# Patient Record
Sex: MALE | Race: WHITE | Employment: FULL TIME | URBAN - METROPOLITAN AREA
[De-identification: names, ages, dates, MRNs, and addresses within clinical notes are randomized per-mention and may not be internally consistent; named-entity substitution may affect disease eponyms.]

---

## 2021-07-21 ENCOUNTER — OFFICE VISIT (OUTPATIENT)
Dept: FAMILY MEDICINE CLINIC | Age: 36
End: 2021-07-21
Payer: COMMERCIAL

## 2021-07-21 ENCOUNTER — TELEPHONE (OUTPATIENT)
Dept: FAMILY MEDICINE CLINIC | Age: 36
End: 2021-07-21

## 2021-07-21 VITALS
OXYGEN SATURATION: 95 % | SYSTOLIC BLOOD PRESSURE: 110 MMHG | HEART RATE: 88 BPM | DIASTOLIC BLOOD PRESSURE: 60 MMHG | WEIGHT: 249.4 LBS | TEMPERATURE: 97.2 F

## 2021-07-21 DIAGNOSIS — R10.9 LEFT FLANK PAIN: Primary | ICD-10-CM

## 2021-07-21 LAB
BILIRUBIN, POC: NEGATIVE
BLOOD URINE, POC: NEGATIVE
CLARITY, POC: CLEAR
COLOR, POC: YELLOW
GLUCOSE URINE, POC: NEGATIVE
KETONES, POC: NEGATIVE
LEUKOCYTE EST, POC: NEGATIVE
NITRITE, POC: NEGATIVE
PH, POC: 7
PROTEIN, POC: NEGATIVE
SPECIFIC GRAVITY, POC: 1.02
UROBILINOGEN, POC: NORMAL

## 2021-07-21 PROCEDURE — 99202 OFFICE O/P NEW SF 15 MIN: CPT | Performed by: PHYSICIAN ASSISTANT

## 2021-07-21 PROCEDURE — 81002 URINALYSIS NONAUTO W/O SCOPE: CPT | Performed by: PHYSICIAN ASSISTANT

## 2021-07-21 NOTE — PROGRESS NOTES
7/21/2021    Davey Tellez    Chief Complaint   Patient presents with    Pain     left sided, near back, x 2 weeks ago     HPI  History was obtained from patient. Yordy De Anda is a 39 y.o. male who presents today with left side pain (points to left flank). The pain has been present for 2 weeks and is \"achy\" in nature and constant. Unable to rate the pain. States it is more annoying than painful. Waxes and wanes. There has been no injury. Denies abdominal pain, urinary frequency, urgency, dysuria, hematuria, penile discharge or bleeding, testicular pain or swelling, nausea, vomiting, fever, chills. There is no radiating pain. No buttock, leg pain, saddle anesthesia, numbness, tingling, bowel or bladder incontinence. Denies recent or current skin rash of this area. Denies recent or current cold-like symptoms, cough, chest pain, or shortness of breath. Denies constipation, melena, or hematochezia. No aggravating or alleviating factors identified. No hx of kidney stones. REVIEW OF SYMPTOMS    Constitutional:  Denies fever, chills, weight loss or weakness  Eyes:  Denies vision changes, photophobia or discharge  ENT:  Denies sore throat, nasal congestion, or ear pain  Cardiovascular:  Denies chest pain, palpitations or swelling  Respiratory:  Denies cough or shortness of breath  GI:  Denies abdominal pain, nausea, vomiting, or diarrhea, melena or hematochezia  : Urinary frequency, urgency, dysuria, hematuria, penile discharge or bleeding  Musculoskeletal:  Admits pain near left flank. See HPI  Skin:  No rashes  Neurologic:  Denies headache, focal weakness, or sensory changes  Endocrine:  Denies polyuria or polydipsia  Lymphatic:  Denies swollen glands    PAST MEDICAL HISTORY  History reviewed. No pertinent past medical history. FAMILY HISTORY  History reviewed. No pertinent family history.     SOCIAL HISTORY  Social History     Socioeconomic History    Marital status:      Spouse name: None    Number of children: None    Years of education: None    Highest education level: None   Occupational History    None   Tobacco Use    Smoking status: Current Every Day Smoker     Packs/day: 1.00     Years: 15.00     Pack years: 15.00     Types: Cigarettes    Smokeless tobacco: Never Used   Substance and Sexual Activity    Alcohol use: Yes     Comment: occasionally    Drug use: None    Sexual activity: None   Other Topics Concern    None   Social History Narrative    None     Social Determinants of Health     Financial Resource Strain:     Difficulty of Paying Living Expenses:    Food Insecurity:     Worried About Running Out of Food in the Last Year:     Ran Out of Food in the Last Year:    Transportation Needs:     Lack of Transportation (Medical):  Lack of Transportation (Non-Medical):    Physical Activity:     Days of Exercise per Week:     Minutes of Exercise per Session:    Stress:     Feeling of Stress :    Social Connections:     Frequency of Communication with Friends and Family:     Frequency of Social Gatherings with Friends and Family:     Attends Mandaen Services:     Active Member of Clubs or Organizations:     Attends Club or Organization Meetings:     Marital Status:    Intimate Partner Violence:     Fear of Current or Ex-Partner:     Emotionally Abused:     Physically Abused:     Sexually Abused:         SURGICAL HISTORY  History reviewed. No pertinent surgical history. CURRENT MEDICATIONS  No current outpatient medications on file. No current facility-administered medications for this visit. ALLERGIES  Allergies   Allergen Reactions    Poison Ivy Extract        PHYSICAL EXAM    /60   Pulse 88   Temp 97.2 °F (36.2 °C) (Infrared)   Wt 249 lb 6.4 oz (113.1 kg)   SpO2 95%     Constitutional:  Well developed, well nourished, does not appear ill. Laughing.   HENT:  Normocephalic, atraumatic  Eyes:  conjunctiva normal, no discharge, no scleral icterus  Neck:  No tenderness, supple  Lymphatic:  No lymphadenopathy noted  Cardiovascular:  Normal heart rate, normal rhythm, no murmurs, gallops or rubs  Thorax & Lungs:  Normal breath sounds, no respiratory distress, no wheezing  Abdomen: Obese, soft, no tenderness to palpation, no palpable mass or organomegaly, no distention  : Denies urinary frequency, urgency, dysuria, hematuria, penile discharge or bleeding, or testicular pain or swelling  Skin:  Warm, dry, no erythema, no rash, no ecchymosis  Back: No reproducible spinal pain, paraspinous muscle pain, or flank pain with palpation or ROM. Full lumbar range of motion. Extremities:  No edema, no tenderness, no cyanosis, no clubbing  Musculoskeletal:  Good range of motion all major joints  Neurologic:  Alert & oriented X 3, normal motor function, normal sensory function, no focal deficits noted  Psychiatric:  Affect normal, mood normal    ASSESSMENT & PLAN    Mandi Juan was seen today for pain. Diagnoses and all orders for this visit:    Left flank pain  -     POCT Urinalysis no Micro  -     CT ABDOMEN PELVIS WO CONTRAST Additional Contrast? Radiologist Recommendation; Future       Benign examination. UA negative for leuks, blood, nit. CT abdomen and pelvis ordered. Patient agreeable to CT at time of office visit, but he later called our office and talked to Shane Linda LPN, to inform us that he would not be getting the test done because it is $3,000 out of pocket. Ankush Cote from registration also called and informed LPN of this. There are no discontinued medications. No follow-ups on file. Patient verbalizes understanding with the above plan and is in agreement. Patient will call with questions or concerns. Please note that this chart was generated using dragon dictation software.   Although every effort was made to ensure the accuracy of this automated transcription, some errors in transcription may have occurred.     Electronically signed by Dejan Whitt PA-C on 7/21/2021

## 2021-07-21 NOTE — TELEPHONE ENCOUNTER
Levar Troncoso from Registration at imaging center and the patient both called stating that pt declines getting the STAT CT scan d/t cost of test is $3,000.  Pt states even with financial assistance and payment plan he is unable to complete the test.

## 2024-07-22 LAB
ESTIMATED AVERAGE GLUCOSE: NORMAL
HBA1C MFR BLD: 6.1 %

## 2025-03-31 ENCOUNTER — OFFICE VISIT (OUTPATIENT)
Dept: FAMILY MEDICINE CLINIC | Age: 40
End: 2025-03-31
Payer: COMMERCIAL

## 2025-03-31 VITALS
WEIGHT: 256 LBS | BODY MASS INDEX: 38.8 KG/M2 | DIASTOLIC BLOOD PRESSURE: 86 MMHG | HEIGHT: 68 IN | HEART RATE: 86 BPM | SYSTOLIC BLOOD PRESSURE: 122 MMHG | OXYGEN SATURATION: 95 %

## 2025-03-31 DIAGNOSIS — Z76.89 ENCOUNTER TO ESTABLISH CARE: Primary | ICD-10-CM

## 2025-03-31 DIAGNOSIS — R73.03 PRE-DIABETES: ICD-10-CM

## 2025-03-31 DIAGNOSIS — E78.5 HYPERLIPIDEMIA, UNSPECIFIED HYPERLIPIDEMIA TYPE: Chronic | ICD-10-CM

## 2025-03-31 DIAGNOSIS — Z72.0 TOBACCO ABUSE: Chronic | ICD-10-CM

## 2025-03-31 PROBLEM — Z91.09 ENVIRONMENTAL ALLERGIES: Status: ACTIVE | Noted: 2023-04-07

## 2025-03-31 PROCEDURE — 99203 OFFICE O/P NEW LOW 30 MIN: CPT

## 2025-03-31 SDOH — ECONOMIC STABILITY: FOOD INSECURITY: WITHIN THE PAST 12 MONTHS, THE FOOD YOU BOUGHT JUST DIDN'T LAST AND YOU DIDN'T HAVE MONEY TO GET MORE.: NEVER TRUE

## 2025-03-31 SDOH — ECONOMIC STABILITY: FOOD INSECURITY: WITHIN THE PAST 12 MONTHS, YOU WORRIED THAT YOUR FOOD WOULD RUN OUT BEFORE YOU GOT MONEY TO BUY MORE.: NEVER TRUE

## 2025-03-31 ASSESSMENT — ENCOUNTER SYMPTOMS
COUGH: 0
NAUSEA: 0
WHEEZING: 0
ABDOMINAL PAIN: 0
SHORTNESS OF BREATH: 0

## 2025-03-31 ASSESSMENT — PATIENT HEALTH QUESTIONNAIRE - PHQ9
6. FEELING BAD ABOUT YOURSELF - OR THAT YOU ARE A FAILURE OR HAVE LET YOURSELF OR YOUR FAMILY DOWN: NOT AT ALL
10. IF YOU CHECKED OFF ANY PROBLEMS, HOW DIFFICULT HAVE THESE PROBLEMS MADE IT FOR YOU TO DO YOUR WORK, TAKE CARE OF THINGS AT HOME, OR GET ALONG WITH OTHER PEOPLE: NOT DIFFICULT AT ALL
SUM OF ALL RESPONSES TO PHQ QUESTIONS 1-9: 1
1. LITTLE INTEREST OR PLEASURE IN DOING THINGS: NOT AT ALL
5. POOR APPETITE OR OVEREATING: SEVERAL DAYS
2. FEELING DOWN, DEPRESSED OR HOPELESS: NOT AT ALL
7. TROUBLE CONCENTRATING ON THINGS, SUCH AS READING THE NEWSPAPER OR WATCHING TELEVISION: NOT AT ALL
9. THOUGHTS THAT YOU WOULD BE BETTER OFF DEAD, OR OF HURTING YOURSELF: NOT AT ALL
8. MOVING OR SPEAKING SO SLOWLY THAT OTHER PEOPLE COULD HAVE NOTICED. OR THE OPPOSITE, BEING SO FIGETY OR RESTLESS THAT YOU HAVE BEEN MOVING AROUND A LOT MORE THAN USUAL: NOT AT ALL
3. TROUBLE FALLING OR STAYING ASLEEP: NOT AT ALL
4. FEELING TIRED OR HAVING LITTLE ENERGY: NOT AT ALL
SUM OF ALL RESPONSES TO PHQ QUESTIONS 1-9: 1

## 2025-03-31 NOTE — PROGRESS NOTES
Niko Zuniga   40 y.o.  male  [id number]      Chief Complaint   Patient presents with    New Patient     Est.care        Subjective:  40 y.o.male is here to establish care. Past medical history of hyperlipemia, prediabetes and tobacco use. Denies past surgical history.     Hyperlipidemia  Cholesterol was elevated back in 7/2024. He was prescribed atorvastatin but has not taken the medication for quite sometime due to being out of medication and finding a new PCP.     Prediabetes   A1C was 6.1 in 7/2024. He states he has changed his diet to eating low carb, and low amounts of sugar. He reports not exercising due to being busy with work.     Tobacco use  He is ready to quit. He has tried chantix in the past but had to stop the medication because it made him very lethargic. Nicotine patches cause a rash and nicotine gum was not helpful. He states he does not want to do tobacco cessation program because he does not have the time to attend one.       Review of Systems   Constitutional:  Negative for activity change, appetite change, fatigue, fever and unexpected weight change.   Respiratory:  Negative for cough, shortness of breath and wheezing.    Cardiovascular:  Negative for chest pain, palpitations and leg swelling.   Gastrointestinal:  Negative for abdominal pain and nausea.   Genitourinary:  Negative for dysuria.   Musculoskeletal:  Negative for arthralgias and myalgias.   Neurological:  Negative for dizziness and weakness.   Psychiatric/Behavioral:  Negative for dysphoric mood and sleep disturbance. The patient is not nervous/anxious.    All other systems reviewed and are negative.      No current outpatient medications on file.     No current facility-administered medications for this visit.        History reviewed. No pertinent past medical history.    Family History   Problem Relation Age of Onset    No Known Problems Father         pt does not know father    Diabetes Maternal Grandmother        History

## 2025-04-05 ENCOUNTER — HOSPITAL ENCOUNTER (OUTPATIENT)
Age: 40
Discharge: HOME | End: 2025-04-05
Payer: COMMERCIAL

## 2025-04-05 DIAGNOSIS — R73.03 PRE-DIABETES: ICD-10-CM

## 2025-04-05 DIAGNOSIS — E78.5 HYPERLIPIDEMIA, UNSPECIFIED HYPERLIPIDEMIA TYPE: Chronic | ICD-10-CM

## 2025-04-05 LAB
CHOLEST SERPL-MCNC: 293 MG/DL (ref 125–199)
EST. AVERAGE GLUCOSE BLD GHB EST-MCNC: 114 MG/DL
HBA1C MFR BLD: 5.6 % (ref 4.2–6.3)
HDLC SERPL-MCNC: 45 MG/DL
LDLC SERPL CALC-MCNC: 216 MG/DL
TRIGL SERPL-MCNC: 161 MG/DL

## 2025-04-05 PROCEDURE — 83036 HEMOGLOBIN GLYCOSYLATED A1C: CPT

## 2025-04-05 PROCEDURE — 80061 LIPID PANEL: CPT

## 2025-04-07 ENCOUNTER — COMMUNITY OUTREACH (OUTPATIENT)
Dept: FAMILY MEDICINE CLINIC | Age: 40
End: 2025-04-07

## 2025-04-07 ENCOUNTER — RESULTS FOLLOW-UP (OUTPATIENT)
Dept: FAMILY MEDICINE CLINIC | Age: 40
End: 2025-04-07

## 2025-04-07 DIAGNOSIS — E78.2 MIXED HYPERLIPIDEMIA: Primary | Chronic | ICD-10-CM

## 2025-04-07 RX ORDER — ATORVASTATIN CALCIUM 10 MG/1
10 TABLET, FILM COATED ORAL DAILY
Qty: 30 TABLET | Refills: 2 | Status: SHIPPED | OUTPATIENT
Start: 2025-04-07

## 2025-04-07 NOTE — PROGRESS NOTES
Care Everywhere audit shows patient had a Hemoglobin A1C done. Health maintenance has been updated.

## 2025-04-08 ENCOUNTER — HOSPITAL ENCOUNTER (OUTPATIENT)
Age: 40
End: 2025-04-08
Payer: COMMERCIAL

## 2025-04-08 ENCOUNTER — OFFICE VISIT (OUTPATIENT)
Dept: FAMILY MEDICINE CLINIC | Age: 40
End: 2025-04-08
Payer: COMMERCIAL

## 2025-04-08 ENCOUNTER — HOSPITAL ENCOUNTER (OUTPATIENT)
Dept: GENERAL RADIOLOGY | Age: 40
Discharge: HOME | End: 2025-04-08
Payer: COMMERCIAL

## 2025-04-08 VITALS
DIASTOLIC BLOOD PRESSURE: 80 MMHG | BODY MASS INDEX: 38.8 KG/M2 | HEIGHT: 68 IN | RESPIRATION RATE: 16 BRPM | OXYGEN SATURATION: 95 % | HEART RATE: 84 BPM | SYSTOLIC BLOOD PRESSURE: 126 MMHG | WEIGHT: 256 LBS

## 2025-04-08 DIAGNOSIS — M79.642 LEFT HAND PAIN: ICD-10-CM

## 2025-04-08 DIAGNOSIS — M79.642 LEFT HAND PAIN: Primary | ICD-10-CM

## 2025-04-08 PROCEDURE — 99213 OFFICE O/P EST LOW 20 MIN: CPT

## 2025-04-08 PROCEDURE — 73130 X-RAY EXAM OF HAND: CPT

## 2025-04-08 RX ORDER — METHYLPREDNISOLONE 4 MG/1
TABLET ORAL
Qty: 1 KIT | Refills: 0 | Status: SHIPPED | OUTPATIENT
Start: 2025-04-08 | End: 2025-04-14

## 2025-04-08 RX ORDER — IBUPROFEN 600 MG/1
600 TABLET, FILM COATED ORAL 3 TIMES DAILY PRN
Qty: 30 TABLET | Refills: 0 | Status: SHIPPED | OUTPATIENT
Start: 2025-04-08

## 2025-04-08 NOTE — PROGRESS NOTES
Niko Zuniga   40 y.o.  male  [id number]      Chief Complaint   Patient presents with    Hand Pain     Left hand, Onset: yesterday @ 2 o'clock, patient was at work and it just randomly started hurting, unable to make a fist or grab anything         Subjective:  40 y.o.male is here for left hand pain.  He states his pain started yesterday while he he was at work.  He denies injury to the area.  Today he has 5 out of 10 pain.  He has taken 400 mg of ibuprofen with no improvement in his pain.  He does have a physical job, with repetitive motions. He states his pain is mostly around his thumb.       Review of Systems   Constitutional:  Negative for activity change, appetite change, fatigue, fever and unexpected weight change.   Respiratory:  Negative for cough, shortness of breath and wheezing.    Cardiovascular:  Negative for chest pain, palpitations and leg swelling.   Gastrointestinal:  Negative for abdominal pain and nausea.   Genitourinary:  Negative for dysuria.   Musculoskeletal:  Positive for arthralgias and myalgias. Negative for joint swelling.   Neurological:  Negative for dizziness and weakness.   Psychiatric/Behavioral:  Negative for dysphoric mood and sleep disturbance. The patient is not nervous/anxious.    All other systems reviewed and are negative.      Current Outpatient Medications   Medication Sig Dispense Refill    methylPREDNISolone (MEDROL DOSEPACK) 4 MG tablet Take by mouth. 1 kit 0    ibuprofen (ADVIL;MOTRIN) 600 MG tablet Take 1 tablet by mouth 3 times daily as needed for Pain 30 tablet 0    atorvastatin (LIPITOR) 10 MG tablet Take 1 tablet by mouth daily 30 tablet 2     No current facility-administered medications for this visit.        History reviewed. No pertinent past medical history.    Family History   Problem Relation Age of Onset    No Known Problems Father         pt does not know father    Diabetes Maternal Grandmother        History reviewed. No pertinent surgical

## 2025-04-09 ENCOUNTER — RESULTS FOLLOW-UP (OUTPATIENT)
Dept: FAMILY MEDICINE CLINIC | Age: 40
End: 2025-04-09

## 2025-04-09 ASSESSMENT — ENCOUNTER SYMPTOMS
COUGH: 0
WHEEZING: 0
ABDOMINAL PAIN: 0
NAUSEA: 0
SHORTNESS OF BREATH: 0

## 2025-04-22 ENCOUNTER — OFFICE VISIT (OUTPATIENT)
Dept: FAMILY MEDICINE CLINIC | Age: 40
End: 2025-04-22
Payer: COMMERCIAL

## 2025-04-22 VITALS
BODY MASS INDEX: 38.35 KG/M2 | OXYGEN SATURATION: 99 % | HEART RATE: 83 BPM | SYSTOLIC BLOOD PRESSURE: 130 MMHG | WEIGHT: 252.2 LBS | DIASTOLIC BLOOD PRESSURE: 100 MMHG

## 2025-04-22 DIAGNOSIS — M65.4 DE QUERVAIN'S TENOSYNOVITIS, LEFT: Primary | ICD-10-CM

## 2025-04-22 DIAGNOSIS — R03.0 ELEVATED BP WITHOUT DIAGNOSIS OF HYPERTENSION: ICD-10-CM

## 2025-04-22 PROCEDURE — 99212 OFFICE O/P EST SF 10 MIN: CPT

## 2025-04-22 NOTE — PROGRESS NOTES
Exam  Vitals and nursing note reviewed.   Constitutional:       General: He is not in acute distress.     Appearance: Normal appearance.   HENT:      Head: Normocephalic.   Pulmonary:      Effort: Pulmonary effort is normal. No respiratory distress.   Musculoskeletal:         General: Normal range of motion.      Right hand: Normal.      Left hand: Normal.   Skin:     General: Skin is warm and dry.   Neurological:      General: No focal deficit present.      Mental Status: He is alert and oriented to person, place, and time.   Psychiatric:         Mood and Affect: Mood normal.         No results found for: \"WBC\", \"HGB\", \"HCT\", \"MCV\", \"PLT\"  No results found for: \"NA\", \"K\", \"CL\", \"CO2\", \"BUN\", \"CREATININE\", \"GLUCOSE\", \"CALCIUM\", \"LABALBU\", \"BILITOT\", \"ALKPHOS\", \"AST\", \"ALT\", \"LABGLOM\", \"GFRAA\", \"AGRATIO\", \"GLOB\"  No results found for: \"CHOL\"  No results found for: \"TRIG\"  Lab Results   Component Value Date    HDL 45 04/05/2025     No components found for: \"LDLCALC\", \"LDLCHOLESTEROL\"  Lab Results   Component Value Date    LABA1C 5.6 04/05/2025     No results found for: \"TSHFT4\", \"TSH\", \"TSHHS\"      ASSESSMENT/PLAN:        1. De Quervain's tenosynovitis, left  -Pain has improved. He is agreeable to attend OT to manage his pain.   - [facility]    2. Elevated BP without diagnosis of hypertension  -Will monitor BP and start on medication at next appointment if BP remains elevated.       Medications Discontinued During This Encounter   Medication Reason    ibuprofen (ADVIL;MOTRIN) 600 MG tablet Patient Choice         Care discussed with patient. Questions answered. Patient verbalizes understanding and agrees with plan.   After visit summary provided.   Advised to call for any problems, questions, or concerns.    Please note that this chart was generated using dragon dictation software.  Although every effort was made to ensure the accuracy of this automated transcription, some errors in

## 2025-04-23 ASSESSMENT — ENCOUNTER SYMPTOMS
NAUSEA: 0
COUGH: 0
SHORTNESS OF BREATH: 0
WHEEZING: 0
ABDOMINAL PAIN: 0

## 2025-04-29 ENCOUNTER — HOSPITAL ENCOUNTER (OUTPATIENT)
Dept: OCCUPATIONAL THERAPY | Age: 40
Setting detail: THERAPIES SERIES
Discharge: HOME | End: 2025-04-29
Payer: COMMERCIAL

## 2025-04-29 PROCEDURE — 97035 APP MDLTY 1+ULTRASOUND EA 15: CPT

## 2025-04-29 PROCEDURE — 97166 OT EVAL MOD COMPLEX 45 MIN: CPT

## 2025-04-29 NOTE — PROGRESS NOTES
Occupational Therapy Initial Assessment  Date:  2025    Patient Name: Niko Zuniga  MRN: [id number]     :  1985     Treatment Diagnosis: M79.642    Restrictions:  Restrictions/Precautions  Restrictions/Precautions: General Precautions  Subjective:  General  Additional Pertinent Hx: Pt left from work 2 weeks ago with significant pain in L hand in thumb CMC area. Had swelling pain and numbness. Was unable to , shut truck door or  objects. 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 Went to doctor one week ago and was put on ibuprofen and given steroids. States pain is gone now. Has a wrist splint but couldn't wear with swelling.  Diagnosis: L DeQuervain's tenosynovitis  M65.4  Referring Provider (secondary): Aleyda DUMONT     Social History: Lives with spouse.     Functional Status: Independent in self care. Has been working with one hand but is increasing use of L hand since symptoms resolved. Wife was driving him to work for a while because of hand pain.                 Date of onset: 14 days    Hand Dominance: Right  Chief complaint:    Recent significant pain in hand of 9/10  Pain:  0/10     Sensation: intact but radial palm had been numb when swollen and painful                                       RIGHT                                                                LEFT                             MMT PROM AROM Shoulder AROM PROM MMT      Flexion         Extension         Abduction         Internal Rot.         Ext. Rot.         Elbow & Forearm        WNL Flex / Ext WNL       WNL Supination WNL       WNL Pronation WNL        Wrist        WNL Flexion WNL       WNL Extension WNL       WNL Ulnar Dev. WNL       WNL Radial Dev. WNL        Thumb         WNL CMC Radial Abd.  WNL       WNL CMC Palmar Abd WNL       WNL MP WNL       WNL IP WNL       Finger Extension/Flexion   RIGHT=WNL    Index  Long Ring Small    MPs    (    ) (    ) (    ) (    )

## 2025-07-31 DIAGNOSIS — E78.2 MIXED HYPERLIPIDEMIA: Chronic | ICD-10-CM

## 2025-07-31 RX ORDER — ATORVASTATIN CALCIUM 10 MG/1
10 TABLET, FILM COATED ORAL DAILY
Qty: 30 TABLET | Refills: 10 | Status: SHIPPED | OUTPATIENT
Start: 2025-07-31